# Patient Record
(demographics unavailable — no encounter records)

---

## 2024-10-15 NOTE — ASSESSMENT
[FreeTextEntry1] : #Acne- chronic, flaring #High risk med use -Disc various treatment options including topicals, po Doxy, po Cornell, Isotretinoin -Patient and mom opting for Isotretinoin  -Plan to start Isotretinoin -Indication for Isotretinoin: acne vulgaris -Major side effects and risks reviewed in detail. Chief among these are teratogenesis, hepatic injury, dyslipidemia, and severe drying of the mucous membranes. Episodes of mood disturbances have been previously reported, including suicidal ideation and attempts in rare cases. The patient will report any such changes in mood, depressive symptoms or suicidal thoughts, headaches, joint or bone pains.  - Cannot share medication with anybody. Females MUST be on two forms of birth control or abstinent while on medication - Registered in [facility] - STOP all other acne medication at this time - Will attain lipids, LFTs, for baseline tests -lab requisition form given and pt to get prior to next visit - POCT pregnancy test negative today - Patient weight 50kg - Goal dose of 120-150 mg/kg (6,000- 7,500), though at times doses up to 220mg (11,000) needed based on clinical response  - Pending labs and repeat preg test next month will start Accutane 20mg daily  RTC 1 month

## 2024-10-15 NOTE — HISTORY OF PRESENT ILLNESS
[FreeTextEntry1] : NP acne [de-identified] : NP here with mom Has acne on face she is very bothered by Tried OTC acne meds Asking about Accutane Not flaring with menses, menses regular, not on any birth control  No history of depression No personal or family history of IBD

## 2024-10-15 NOTE — PHYSICAL EXAM
[FreeTextEntry3] : Several pink acneiform papules b/l cheeks>forehead, chin Intermixed with hyperpigmented macules and papules

## 2024-10-29 NOTE — PLAN
[TextEntry] : Vasovagal syncope, neurologically mediated syncope (NMS) or a 'simple faint'. It is the most common cause of fainting. This can occur in various situations: -Fear. -Severe pain or emotional stress. -Witnessing something distressing; for example, some people faint at the sight of blood. -After extreme exercise. -After prolonged standing, especially in hot places -When wearing tight collars that constrict the neck. Signs you're about to faint Looking pale and feeling sweaty. Your eyes will usually stay open. Orthostatic hypotension/postural hypotension a sudden drop in blood pressure in standing up, which can cause them to faint. It can occur: -Due to medication prescribed to lower blood pressure. -During being sick (vomiting) or experiencing runny stools (diarrhoea) and other reasons for having a lack of fluid in the body (being dehydrated). -After a big meal.  Fainting is common at all ages and affects up to 4 in 10 people at least once in their lives, usually happens for the first time in teenage years and affect girls more than boys  When feeling faint -lie down.  -If possible, elevate  legs slightly  -sit up slowly over a few minutes

## 2024-10-29 NOTE — HISTORY OF PRESENT ILLNESS
[de-identified] : Lightheaded [FreeTextEntry6] : Sore throat nausea, post nasal drip, cough x 5 days, was feeling better this morning and got up to brush teeth to go to school Felt dizzy and lightheaded, vision blacked out but did not pass out Has had vision going black in the past but related to anxiety Had 1 bottle of water yesterday and a glass of coke.  Tends to have limited liquid intake, does not eat breakfast for fear of IBS symptoms

## 2024-11-23 NOTE — ASSESSMENT
[FreeTextEntry1] : #Acne- chronic, flaring #High risk med use -Disc various treatment options including topicals, po Doxy, po Cornell, Isotretinoin -Patient and mom opting for Isotretinoin  -Plan to start Isotretinoin -Indication for Isotretinoin: acne vulgaris -Major side effects and risks reviewed in detail. Chief among these are teratogenesis, hepatic injury, dyslipidemia, and severe drying of the mucous membranes. Episodes of mood disturbances have been previously reported, including suicidal ideation and attempts in rare cases. The patient will report any such changes in mood, depressive symptoms or suicidal thoughts, headaches, joint or bone pains.  - Cannot share medication with anybody. Females MUST be on two forms of birth control or abstinent while on medication - Registered in [facility]ge - STOP all other acne medication at this time - Baseline lfts and lipids reviewed - POCT pregnancy test negative today - Patient weight 50kg - Goal dose of 120-150 mg/kg (6,000- 7,500), though at times doses up to 220mg (11,000) needed based on clinical response  - Will start Accutane 20mg daily  RTC 1 month

## 2024-11-23 NOTE — HISTORY OF PRESENT ILLNESS
[FreeTextEntry1] : JU acne [de-identified] : RP here with mom Has acne on face she is very bothered by Tried OTC acne meds Asking about Accutane Not flaring with menses, menses regular, not on any birth control  No history of depression No personal or family history of IBD

## 2025-01-10 NOTE — REVIEW OF SYSTEMS
[Appetite Changes] : appetite changes [PO Intolerance] : PO intolerance [Vomiting] : vomiting [Diarrhea] : diarrhea [Gaseous] : gaseous [Abdominal Pain] : abdominal pain [Negative] : Genitourinary

## 2025-01-10 NOTE — HISTORY OF PRESENT ILLNESS
[de-identified] : ED visit follow up after food poisoning [FreeTextEntry6] : - went to ED 4 days prior, for repeated emesis, diarrhea, abdominal pain, not tolerating po after she ate pork sandwiches - in ED, received pain medication, ondansetron, and IVF, all tests (nose viral swab, blood, urine, pregnancy, US including pelvis, except stool sampling failed) are unremarkable, discharged home on famotidine - was stable for 2 days with no emesis nor pain since then, but diarrhea restarted this morning with 'sulfa burping' - mother concerned it could be IBS, requesting GI referral - denies fever, skin rash, sore throat, headache, back pain, blood in stools - LMP last week - requested school note for being excused school today

## 2025-01-10 NOTE — DISCUSSION/SUMMARY
[FreeTextEntry1] : # recurrent emesis and diarrhea - strep negative in the office; throat culture will be sent - GI panel ordered - supportive care advised: ORT, small frequent feeding, liquid or soft diet, advance as tolerated etc.  - RTC for persistent high fever (longer than 3 days), recurrent emesis, bloody stools, severe abdominal pain, poor oral intake, decreased UOP, extreme fussiness, lethargy, or any new concerns/symptoms. - GI referral will be sent (new [facility] doctor coming by the end of this month at [address] subspecialty clinic)

## 2025-01-10 NOTE — PHYSICAL EXAM
[Capillary Refill <2s] : capillary refill < 2s [NL] : warm, clear [McBurney's point tenderness] : no McBurney's point tenderness [Rebound tenderness] : no rebound tenderness

## 2025-03-21 NOTE — REVIEW OF SYSTEMS
[Fever] : fever [Malaise] : malaise [Headache] : no headache [Ear Pain] : no ear pain [Nasal Discharge] : nasal discharge [Nasal Congestion] : nasal congestion [Sinus Pressure] : no sinus pressure [Sore Throat] : no sore throat [Appetite Changes] : appetite changes [Vomiting] : no vomiting [Diarrhea] : no diarrhea [Negative] : Skin

## 2025-03-21 NOTE — DISCUSSION/SUMMARY
[FreeTextEntry1] : unremarkable exam, viral etiology 4 plex PCR +covid, flu A and RSV  result called to mom and discussed supportive care for viral infections in general but since fever onset yesterday and if flu related within time frame of taking Tamiflu and Rx sent to use. counseled on making sure eating before taking the medication to avoid possible GI upset.

## 2025-03-21 NOTE — HISTORY OF PRESENT ILLNESS
[Fever] : FEVER [EENT/Resp Symptoms] : EENT/RESPIRATORY SYMPTOMS [FreeTextEntry6] : Patient is here for sick visit due to fever started yesterday, nasally congested and stuffy and tried flonase once but has not worked, achy/tired overall, some coughing but not much, nauseous but no n/v, decreased appetite in general.

## 2025-03-21 NOTE — PHYSICAL EXAM
[Clear] : right tympanic membrane clear [Inflamed Nasal Mucosa] : inflamed nasal mucosa [NL] : clear to auscultation bilaterally [Clear to Auscultation Bilaterally] : clear to auscultation bilaterally [FreeTextEntry4] : no frontal/maxillary sinus pressures on palpation

## 2025-03-25 NOTE — HISTORY OF PRESENT ILLNESS
[FreeTextEntry6] : Sonia is here for f/u, has flu and covid currently and mom says she's not feeling much better in terms of  still tired and staying in bed a lot and not been to school, never had a fever and coughing more but is able to sleep normally, more than usual, mom makes her eat/drink and is using the flonase spray once a day but mom is reminding her.   Sonia reporting not smelling normally right now due to her nasal congestion and mostly mucus in throat and is spitting it out but not feeling tight in the chest of labored.

## 2025-03-25 NOTE — PHYSICAL EXAM
[Clear] : right tympanic membrane clear [Pale Nasal Mucosa] : pale nasal mucosa [Inflamed Nasal Mucosa] : inflamed nasal mucosa [Hypertrophied Nasal Mucosa] : hypertrophied nasal mucosa [NL] : nonerythematous oropharynx [FreeTextEntry1] : no coughing heard during office visit

## 2025-03-25 NOTE — DISCUSSION/SUMMARY
[FreeTextEntry1] : unchanged exam since last week  ears/lung clear d/w mom/Sonia both COVID and flu will produce fatigue and COVID especially can even cause longer post infection fatigue, currently still on Tamiflu needs to finish and since no fever, if return to school can go back this Friday. Fatigue might linger for while as each person processes viral infections differently and both viral infections do just that for most patients and its not unsual.  continue to hydrate/rest for immune system to heal and use flonase bid instead  sense of smell should return as she recovers from COVID

## 2025-03-25 NOTE — REVIEW OF SYSTEMS
[Malaise] : malaise [Nasal Discharge] : nasal discharge [Nasal Congestion] : nasal congestion [Cough] : cough [Congestion] : congestion [Negative] : Gastrointestinal [Fever] : no fever [Wheezing] : no wheezing [Shortness of Breath] : no shortness of breath